# Patient Record
Sex: MALE | Race: WHITE | Employment: OTHER | URBAN - METROPOLITAN AREA
[De-identification: names, ages, dates, MRNs, and addresses within clinical notes are randomized per-mention and may not be internally consistent; named-entity substitution may affect disease eponyms.]

---

## 2021-01-04 ENCOUNTER — OFFICE VISIT (OUTPATIENT)
Dept: PRIMARY CARE CLINIC | Age: 71
End: 2021-01-04
Payer: MEDICARE

## 2021-01-04 DIAGNOSIS — Z01.812 PRE-PROCEDURAL LABORATORY EXAMINATIONS: Primary | ICD-10-CM

## 2021-01-04 PROCEDURE — 99211 OFF/OP EST MAY X REQ PHY/QHP: CPT | Performed by: NURSE PRACTITIONER

## 2021-01-05 LAB — SARS-COV-2, NAA: NOT DETECTED

## 2021-01-07 ENCOUNTER — HOSPITAL ENCOUNTER (OUTPATIENT)
Dept: GENERAL RADIOLOGY | Age: 71
Discharge: HOME | End: 2021-01-07
Payer: MEDICARE

## 2021-01-07 ENCOUNTER — HOSPITAL ENCOUNTER (OUTPATIENT)
Dept: CT IMAGING | Age: 71
Discharge: HOME | End: 2021-01-07
Payer: MEDICARE

## 2021-01-07 VITALS
RESPIRATION RATE: 16 BRPM | DIASTOLIC BLOOD PRESSURE: 73 MMHG | BODY MASS INDEX: 26.05 KG/M2 | OXYGEN SATURATION: 95 % | HEIGHT: 70 IN | SYSTOLIC BLOOD PRESSURE: 152 MMHG | TEMPERATURE: 97.5 F | HEART RATE: 68 BPM | WEIGHT: 182 LBS

## 2021-01-07 DIAGNOSIS — M43.16 SPONDYLOLISTHESIS, LUMBAR REGION: ICD-10-CM

## 2021-01-07 DIAGNOSIS — M51.36 LUMBAR ADJACENT SEGMENT DISEASE WITH SPONDYLOLISTHESIS: ICD-10-CM

## 2021-01-07 DIAGNOSIS — M43.16 LUMBAR ADJACENT SEGMENT DISEASE WITH SPONDYLOLISTHESIS: ICD-10-CM

## 2021-01-07 LAB
GLUCOSE BLD-MCNC: 147 MG/DL (ref 70–99)
INR BLD: 0.96 (ref 0.86–1.14)
PERFORMED ON: ABNORMAL
PLATELET # BLD: 142 K/UL (ref 135–450)
PROTHROMBIN TIME: 11.1 SEC (ref 10–13.2)

## 2021-01-07 PROCEDURE — 72132 CT LUMBAR SPINE W/DYE: CPT

## 2021-01-07 PROCEDURE — 7100000011 HC PHASE II RECOVERY - ADDTL 15 MIN

## 2021-01-07 PROCEDURE — 85610 PROTHROMBIN TIME: CPT

## 2021-01-07 PROCEDURE — 36415 COLL VENOUS BLD VENIPUNCTURE: CPT

## 2021-01-07 PROCEDURE — 62304 MYELOGRAPHY LUMBAR INJECTION: CPT

## 2021-01-07 PROCEDURE — 7100000010 HC PHASE II RECOVERY - FIRST 15 MIN

## 2021-01-07 PROCEDURE — 85049 AUTOMATED PLATELET COUNT: CPT

## 2021-01-07 PROCEDURE — 6360000004 HC RX CONTRAST MEDICATION: Performed by: PHYSICIAN ASSISTANT

## 2021-01-07 PROCEDURE — 72265 MYELOGRAPHY L-S SPINE: CPT

## 2021-01-07 RX ORDER — SODIUM CHLORIDE 0.9 % (FLUSH) 0.9 %
10 SYRINGE (ML) INJECTION EVERY 12 HOURS SCHEDULED
Status: DISCONTINUED | OUTPATIENT
Start: 2021-01-07 | End: 2021-01-08 | Stop reason: HOSPADM

## 2021-01-07 RX ORDER — ATORVASTATIN CALCIUM 40 MG/1
40 TABLET, FILM COATED ORAL DAILY
COMMUNITY

## 2021-01-07 RX ORDER — PANTOPRAZOLE SODIUM 20 MG/1
20 TABLET, DELAYED RELEASE ORAL DAILY
COMMUNITY

## 2021-01-07 RX ORDER — LOSARTAN POTASSIUM AND HYDROCHLOROTHIAZIDE 25; 100 MG/1; MG/1
1 TABLET ORAL DAILY
COMMUNITY

## 2021-01-07 RX ORDER — SODIUM CHLORIDE 0.9 % (FLUSH) 0.9 %
10 SYRINGE (ML) INJECTION PRN
Status: DISCONTINUED | OUTPATIENT
Start: 2021-01-07 | End: 2021-01-08 | Stop reason: HOSPADM

## 2021-01-07 RX ORDER — OXYCODONE AND ACETAMINOPHEN 7.5; 325 MG/1; MG/1
1 TABLET ORAL EVERY 4 HOURS PRN
COMMUNITY
End: 2022-05-10 | Stop reason: ALTCHOICE

## 2021-01-07 RX ADMIN — IOPAMIDOL 20 ML: 408 INJECTION, SOLUTION INTRATHECAL at 12:05

## 2021-01-07 ASSESSMENT — PAIN DESCRIPTION - PAIN TYPE: TYPE: CHRONIC PAIN

## 2021-01-07 ASSESSMENT — PAIN - FUNCTIONAL ASSESSMENT: PAIN_FUNCTIONAL_ASSESSMENT: PREVENTS OR INTERFERES SOME ACTIVE ACTIVITIES AND ADLS

## 2021-01-07 NOTE — PROGRESS NOTES
Ambulatory Surgery/Procedure Discharge Note    Vitals:    01/07/21 1324   BP: (!) 152/73   Pulse: 68   Resp: 16   Temp: 97.5 °F (36.4 °C)   SpO2: 95%   BP stable    No intake/output data recorded. Restroom use offered before discharge. Yes    Pain assessment:  present - adequately treated  Pain Level: (just has \"normal pain\")        Patient discharged to home/self care.  Patient discharged via wheel chair by transporter to waiting family/S.O.       1/7/2021 1:37 PM

## 2021-01-07 NOTE — PROGRESS NOTES
Discharge instructions given to patient. Verbalizes understanding. Just received call that IR is going to re-scan patient. Patient aware.

## 2022-05-10 RX ORDER — TAMSULOSIN HYDROCHLORIDE 0.4 MG/1
0.4 CAPSULE ORAL DAILY
COMMUNITY
Start: 2020-12-23

## 2022-05-10 RX ORDER — ASCORBIC ACID 500 MG
1000 TABLET ORAL DAILY
COMMUNITY

## 2022-05-10 RX ORDER — GLIMEPIRIDE 1 MG/1
1 TABLET ORAL
COMMUNITY
Start: 2020-12-23

## 2022-05-10 RX ORDER — SOLIFENACIN SUCCINATE 5 MG/1
5 TABLET, FILM COATED ORAL DAILY
COMMUNITY

## 2022-05-10 RX ORDER — HYDROCODONE BITARTRATE AND ACETAMINOPHEN 7.5; 325 MG/1; MG/1
1 TABLET ORAL EVERY 6 HOURS PRN
COMMUNITY

## 2022-05-10 RX ORDER — PREGABALIN 100 MG/1
100 CAPSULE ORAL 2 TIMES DAILY
COMMUNITY
Start: 2020-12-09

## 2022-05-10 RX ORDER — VITAMIN E 268 MG
400 CAPSULE ORAL DAILY
COMMUNITY

## 2022-05-10 RX ORDER — DICLOFENAC SODIUM 75 MG/1
75 TABLET, DELAYED RELEASE ORAL DAILY
Status: ON HOLD | COMMUNITY
Start: 2020-12-23 | End: 2022-05-17 | Stop reason: HOSPADM

## 2022-05-10 RX ORDER — LANOLIN ALCOHOL/MO/W.PET/CERES
1000 CREAM (GRAM) TOPICAL DAILY
COMMUNITY

## 2022-05-10 NOTE — PROGRESS NOTES
C-diff Questionnaire:     * Admitted with diarrhea? [] YES    [x]  NO     *Prior history of C-Diff. In last 3 months? [] YES    [x]  NO     *Antibiotic use in the past 6-8 weeks? [x]  NO    []  YES      If yes, which: REASON_________________     *Prior hospitalization or nursing home in the last month? []  YES    [x]  NO     SAFETY FIRST. .call before you fall    [address] time__0930__________        Surgery time__1100__________    Do not eat or drink anything after 12:00 midnight prior to your surgery. Follow your MD/Surgeons pre-procedure instructions regarding your medications  This includes water chewing gum, mints and ice chips- the Day of Surgery. You may brush your teeth and gargle the morning of your surgery, but do not swallow the water     Please see your family doctor/pediatrician for a history and physical and/or questions concerning medications. Bring any test results/reports from your physicians office. If you are under the care of a heart doctor or specialist doctor, please be aware that you may be asked to them for clearance    You may be asked to stop blood thinners such as Coumadin, Plavix, Fragmin, Lovenox, etc., or any anti-inflammatories such as:  Aspirin, Ibuprofen, Advil, Naproxen prior to your surgery. We also ask that you stop any OTC medications such as fish oil, vitamin E, glucosamine, garlic, Multivitamins, COQ 10, etc. MAY TAKE TYLENOL    We ask that you do not smoke 24 hours prior to surgery  We ask that you do not  drink any alcoholic beverages 24 hours prior to surgery     You must make arrangements for a responsible adult to take you home after your surgery. For your safety you will not be allowed to leave alone or drive yourself home. Your surgery will be cancelled if you do not have a ride home.      Also for your safety, it is strongly suggested that someone stay with you the first 24 hours after your surgery. A parent or legal guardian must accompany a child scheduled for surgery and plan to stay at the hospital until the child is discharged. Please do not bring other children with you. For your comfort, please wear simple loose fitting clothing to the hospital.  Please do not bring valuables. Do not wear any make-up or nail polish on your fingers or toes. For your safety, please do not wear any jewelry or body piercing's on the day of surgery. All jewelry must be removed. If you have dentures, they will be removed before going to operating room. For your convenience, we will provide you with a container. If you wear contact lenses or glasses, they will be removed, please bring a case for them. If you have a living will and a durable power of  for healthcare, please bring in a copy. As part of our patient safety program to minimize surgical site infections, we ask you to do the following:    · Please notify your surgeon if you develop any illness between         now and the day of your surgery. · This includes a cough, cold, fever, sore throat, nausea,         or vomiting, and diarrhea, etc.  ·  Please notify your surgeon if you experience dizziness, shortness         of breath or blurred vision between now and the time of your surgery. Do not shave your operative site 96 hours prior to surgery. For face and neck surgery, men may use an electric razor 48 hours   prior to surgery. You may shower the night before surgery or the morning of   your surgery with an antibacterial soap. You will need to bring a photo ID and insurance card     If you use a C-pap or Bi-pap machine, please bring your machine with you to the hospital     Our goal is to provide you with excellent care, therefore, visitors will be limited to so that we may focus on providing this care for you. Please contact your surgeon office, if you have any further questions.

## 2022-05-16 ENCOUNTER — ANESTHESIA EVENT (OUTPATIENT)
Dept: ENDOSCOPY | Age: 72
End: 2022-05-16
Payer: MEDICARE

## 2022-05-17 ENCOUNTER — HOSPITAL ENCOUNTER (OUTPATIENT)
Age: 72
Setting detail: OUTPATIENT SURGERY
Discharge: HOME | End: 2022-05-17
Attending: INTERNAL MEDICINE | Admitting: INTERNAL MEDICINE
Payer: MEDICARE

## 2022-05-17 ENCOUNTER — ANESTHESIA (OUTPATIENT)
Dept: ENDOSCOPY | Age: 72
End: 2022-05-17
Payer: MEDICARE

## 2022-05-17 VITALS
RESPIRATION RATE: 18 BRPM | WEIGHT: 178.7 LBS | DIASTOLIC BLOOD PRESSURE: 84 MMHG | BODY MASS INDEX: 25.58 KG/M2 | SYSTOLIC BLOOD PRESSURE: 162 MMHG | HEIGHT: 70 IN | TEMPERATURE: 96.5 F | HEART RATE: 89 BPM | OXYGEN SATURATION: 97 %

## 2022-05-17 DIAGNOSIS — K74.60 HEPATIC CIRRHOSIS, UNSPECIFIED HEPATIC CIRRHOSIS TYPE, UNSPECIFIED WHETHER ASCITES PRESENT (HCC): ICD-10-CM

## 2022-05-17 DIAGNOSIS — Z12.11 COLON CANCER SCREENING: ICD-10-CM

## 2022-05-17 LAB
GLUCOSE BLD-MCNC: 128 MG/DL (ref 70–99)
GLUCOSE BLD-MCNC: 140 MG/DL (ref 70–99)
PERFORMED ON: ABNORMAL
PERFORMED ON: ABNORMAL

## 2022-05-17 PROCEDURE — 7100000010 HC PHASE II RECOVERY - FIRST 15 MIN: Performed by: INTERNAL MEDICINE

## 2022-05-17 PROCEDURE — 3609017100 HC EGD: Performed by: INTERNAL MEDICINE

## 2022-05-17 PROCEDURE — 88305 TISSUE EXAM BY PATHOLOGIST: CPT

## 2022-05-17 PROCEDURE — 7100000011 HC PHASE II RECOVERY - ADDTL 15 MIN: Performed by: INTERNAL MEDICINE

## 2022-05-17 PROCEDURE — 2580000003 HC RX 258: Performed by: NURSE ANESTHETIST, CERTIFIED REGISTERED

## 2022-05-17 PROCEDURE — 3609010600 HC COLONOSCOPY POLYPECTOMY SNARE/COLD BIOPSY: Performed by: INTERNAL MEDICINE

## 2022-05-17 PROCEDURE — 2500000003 HC RX 250 WO HCPCS: Performed by: NURSE ANESTHETIST, CERTIFIED REGISTERED

## 2022-05-17 PROCEDURE — 3700000000 HC ANESTHESIA ATTENDED CARE: Performed by: INTERNAL MEDICINE

## 2022-05-17 PROCEDURE — 3700000001 HC ADD 15 MINUTES (ANESTHESIA): Performed by: INTERNAL MEDICINE

## 2022-05-17 PROCEDURE — 2709999900 HC NON-CHARGEABLE SUPPLY: Performed by: INTERNAL MEDICINE

## 2022-05-17 PROCEDURE — 6360000002 HC RX W HCPCS: Performed by: NURSE ANESTHETIST, CERTIFIED REGISTERED

## 2022-05-17 PROCEDURE — 7100000000 HC PACU RECOVERY - FIRST 15 MIN: Performed by: INTERNAL MEDICINE

## 2022-05-17 PROCEDURE — 7100000001 HC PACU RECOVERY - ADDTL 15 MIN: Performed by: INTERNAL MEDICINE

## 2022-05-17 RX ORDER — LIDOCAINE HYDROCHLORIDE 20 MG/ML
INJECTION, SOLUTION EPIDURAL; INFILTRATION; INTRACAUDAL; PERINEURAL PRN
Status: DISCONTINUED | OUTPATIENT
Start: 2022-05-17 | End: 2022-05-17 | Stop reason: SDUPTHER

## 2022-05-17 RX ORDER — SODIUM CHLORIDE 0.9 % (FLUSH) 0.9 %
5-40 SYRINGE (ML) INJECTION PRN
Status: DISCONTINUED | OUTPATIENT
Start: 2022-05-17 | End: 2022-05-17 | Stop reason: HOSPADM

## 2022-05-17 RX ORDER — SODIUM CHLORIDE 9 MG/ML
INJECTION, SOLUTION INTRAVENOUS CONTINUOUS
Status: DISCONTINUED | OUTPATIENT
Start: 2022-05-17 | End: 2022-05-17 | Stop reason: HOSPADM

## 2022-05-17 RX ORDER — SODIUM CHLORIDE 0.9 % (FLUSH) 0.9 %
5-40 SYRINGE (ML) INJECTION EVERY 12 HOURS SCHEDULED
Status: DISCONTINUED | OUTPATIENT
Start: 2022-05-17 | End: 2022-05-17 | Stop reason: HOSPADM

## 2022-05-17 RX ORDER — ONDANSETRON 2 MG/ML
4 INJECTION INTRAMUSCULAR; INTRAVENOUS
Status: CANCELLED | OUTPATIENT
Start: 2022-05-17 | End: 2022-05-17

## 2022-05-17 RX ORDER — SODIUM CHLORIDE 0.9 % (FLUSH) 0.9 %
5-40 SYRINGE (ML) INJECTION PRN
Status: CANCELLED | OUTPATIENT
Start: 2022-05-17

## 2022-05-17 RX ORDER — GLYCOPYRROLATE 0.2 MG/ML
INJECTION INTRAMUSCULAR; INTRAVENOUS PRN
Status: DISCONTINUED | OUTPATIENT
Start: 2022-05-17 | End: 2022-05-17 | Stop reason: SDUPTHER

## 2022-05-17 RX ORDER — SODIUM CHLORIDE 9 MG/ML
INJECTION, SOLUTION INTRAVENOUS PRN
Status: CANCELLED | OUTPATIENT
Start: 2022-05-17

## 2022-05-17 RX ORDER — SODIUM CHLORIDE 9 MG/ML
INJECTION, SOLUTION INTRAVENOUS CONTINUOUS PRN
Status: DISCONTINUED | OUTPATIENT
Start: 2022-05-17 | End: 2022-05-17 | Stop reason: SDUPTHER

## 2022-05-17 RX ORDER — PROPOFOL 10 MG/ML
INJECTION, EMULSION INTRAVENOUS PRN
Status: DISCONTINUED | OUTPATIENT
Start: 2022-05-17 | End: 2022-05-17 | Stop reason: SDUPTHER

## 2022-05-17 RX ORDER — SODIUM CHLORIDE 0.9 % (FLUSH) 0.9 %
5-40 SYRINGE (ML) INJECTION EVERY 12 HOURS SCHEDULED
Status: CANCELLED | OUTPATIENT
Start: 2022-05-17

## 2022-05-17 RX ORDER — SODIUM CHLORIDE 9 MG/ML
INJECTION, SOLUTION INTRAVENOUS PRN
Status: DISCONTINUED | OUTPATIENT
Start: 2022-05-17 | End: 2022-05-17 | Stop reason: HOSPADM

## 2022-05-17 RX ADMIN — GLYCOPYRROLATE 0.2 MG: 0.2 INJECTION, SOLUTION INTRAMUSCULAR; INTRAVENOUS at 10:56

## 2022-05-17 RX ADMIN — SODIUM CHLORIDE: 9 INJECTION, SOLUTION INTRAVENOUS at 10:53

## 2022-05-17 RX ADMIN — PROPOFOL 100 MG: 10 INJECTION, EMULSION INTRAVENOUS at 10:56

## 2022-05-17 RX ADMIN — PROPOFOL 100 MG: 10 INJECTION, EMULSION INTRAVENOUS at 11:00

## 2022-05-17 RX ADMIN — PROPOFOL 100 MG: 10 INJECTION, EMULSION INTRAVENOUS at 11:05

## 2022-05-17 RX ADMIN — LIDOCAINE HYDROCHLORIDE 60 MG: 20 INJECTION, SOLUTION EPIDURAL; INFILTRATION; INTRACAUDAL; PERINEURAL at 10:56

## 2022-05-17 RX ADMIN — PROPOFOL 100 MG: 10 INJECTION, EMULSION INTRAVENOUS at 11:15

## 2022-05-17 ASSESSMENT — PAIN DESCRIPTION - DESCRIPTORS: DESCRIPTORS: ACHING

## 2022-05-17 ASSESSMENT — LIFESTYLE VARIABLES: SMOKING_STATUS: 0

## 2022-05-17 ASSESSMENT — PAIN - FUNCTIONAL ASSESSMENT
PAIN_FUNCTIONAL_ASSESSMENT: 0-10
PAIN_FUNCTIONAL_ASSESSMENT: PREVENTS OR INTERFERES SOME ACTIVE ACTIVITIES AND ADLS

## 2022-05-17 NOTE — OP NOTE
Endoscopy Note    Patient: Dao Elizalde  : 1950  Acct#:     Procedure: Esophagogastroduodenoscopy                        Colonoscopy with polypectomy (cold snare)    Date:  2022     Surgeon:   Charo Clark MD    Referring Physician:  Mey Saxena    Indications: This is a 71 y.o. male  who presents for EGD and colonoscopy due to variceal and colon cancer screening. Postoperative Diagnosis:    1. Small esophageal varices  2. Mild portal hypertensive gastropathy  3. 5 mm transverse colon polyp, resected with cold snare  4. 2 mm transverse colon polyp, resected with cold snare  5. 3 mm rectal polyp, resected with cold snare  6. 4 mm rectal polyp, resected with cold snare  Anesthesia:  MAC    Consent:  The patient or their legal guardian has signed a consent, and is aware of the potential risks, benefits, alternatives, and potential complications of this procedure. These include, but are not limited to hemorrhage, bleeding, post procedural pain, perforation, phlebitis, aspiration, hypotension, hypoxia, cardiovascular events such as arryhthmia, and possibly death. Additionally, the possibility of missed colonic polyps and interval colon cancer was discussed in the consent. Description of Procedure: The patient was then taken to the endoscopy suite, placed in the left lateral decubitus position and the above IV sedation was administrered. The Olympus video endoscope was placed through the patient's oropharynx without difficulty to the extent of the 2nd portion of the duodenum. Both forward and retroflexed views of the stomach were obtained. Findings:    Esophagus: Small esophageal varices were present in the distal esophagus, which flattened on insufflation. The esophagus otherwise appeared normal without evidence of Negrete's esophagus or reflux esophagitis. Z-line was located 37 cm from the incisors. Stomach:  The stomach was notable for mild portal hypertensive gastropathy, and otherwise appeared normal on forward and retroflexed views. Duodenum: The first and 2nd portions of the duodenum appeared normal with normal villous pattern    The scope was then withdrawn back into the stomach, it was decompressed, and the scope was completely withdrawn. A digital rectal examination was performed and revealed negative without mass, lesions or tenderness. The Olympus video colonoscope was placed in the patient's rectum under digital direction and advanced to the cecum. The cecum was identified by characteristic anatomy and ballottment. The prep was good. The ileocecal valve was identified. The terminal ileum was not inspected. The scope was then withdrawn back through the cecum, ascending, transverse, descending, sigmoid colon, and rectum. Careful circumferential examination of the mucosa in these areas demonstrated:    - 5 mm transverse colon polyp, resected with cold snare  - 2 mm transverse colon polyp, resected with cold snare  - 3 mm rectal polyp, resected with cold snare  - 4 mm rectal polyp, resected with cold snare    The scope was then withdrawn into the rectum and retroflexed. The retroflexed view of the anal verge and rectum demonstrates small internal hemorrhoids. The scope was straightened, the colon was decompressed and the scope was withdrawn from the patient. The patient tolerated the procedure well and was taken to the PACU in good condition. Estimated Blood Loss: Minimal     Impression:    1) See post procedure diagnoses    Recommendations:   - Follow-up pathology results in 7 days, by calling the office at [phone].  - Resume regular medications. - Resume diet as tolerated. - Repeat colonoscopy in 5 years. - Will defer initiation of nadolol to Dr. Eleazar Franco. Repeat EGD in 3 years.     ROME Viveros [address] [facility]

## 2022-05-17 NOTE — H&P
Pre-operative History and Physical    Patient: Dao Elizalde  : 1950  Acct#:     Intended Procedure:  EGD and colonoscopy    HISTORY OF PRESENT ILLNESS:  The patient is a 71 y.o. male  who presents for EGD and colonoscopy due to variceal and colon cancer screening. Past Medical History:        Diagnosis Date    Anxiety     Arthritis     Cancer ([facility] [address]     PROSTATE    Circulation problem     DUE TO DIABETES    Diabetes mellitus ([facility] [address]     TYPE 2    Diabetic neuropathy (HCC)     Fibromyalgia     Hepatitis C     TREATED     Hyperlipidemia     Hypertension     Liver cirrhosis (HCC)     Migraine     Short-term memory loss     DUE TO BRAIN ANEURYSM     Past Surgical History:        Procedure Laterality Date    BACK SURGERY      BRAIN SURGERY      INJURED IN [place] IN HEAD-CAUSED ANEURYSM     Medications Prior to Admission:   No current facility-administered medications on file prior to encounter. Current Outpatient Medications on File Prior to Encounter   Medication Sig Dispense Refill    glimepiride (AMARYL) 1 MG tablet Take 1 mg by mouth every morning (before breakfast)       diclofenac (VOLTAREN) 75 MG EC tablet Take 75 mg by mouth daily       pregabalin (LYRICA) 100 MG capsule Take 100 mg by mouth 2 times daily.  tamsulosin (FLOMAX) 0.4 MG capsule Take 0.4 mg by mouth daily       HYDROcodone-acetaminophen (NORCO) 7.5-325 MG per tablet Take 1 tablet by mouth every 6 hours as needed for Pain.       Multiple Vitamin (MULTIVITAMIN ADULT PO) Take 1 tablet by mouth daily      vitamin B-12 (CYANOCOBALAMIN) 1000 MCG tablet Take 1,000 mcg by mouth daily      vitamin C (ASCORBIC ACID) 500 MG tablet Take 1,000 mg by mouth daily      solifenacin (VESICARE) 5 MG tablet Take 5 mg by mouth daily      vitamin E 400 UNIT capsule Take 400 Units by mouth daily      atorvastatin (LIPITOR) 40 MG tablet Take 40 mg by mouth daily      losartan-hydroCHLOROthiazide (HYZAAR) 100-25 MG per tablet Take 1 tablet by mouth daily      metFORMIN (GLUCOPHAGE) 1000 MG tablet Take 1,000 mg by mouth 2 times daily (with meals)      pantoprazole (PROTONIX) 20 MG tablet Take 20 mg by mouth daily          Allergies:  Ace inhibitors and Duloxetine hcl    Social History:   TOBACCO:   reports that he has never smoked. He has never used smokeless tobacco.  ETOH:   reports previous alcohol use. DRUGS:   reports no history of drug use. PHYSICAL EXAM:      Vital Signs: BP (!) 160/82   Pulse 90   Temp 97.7 °F (36.5 °C) (Temporal)   Resp 18   Ht 5' 10\" (1.778 m)   Wt 178 lb 11.2 oz (81.1 kg)   SpO2 95%   BMI 25.64 kg/m²    Airway: No stridor or wheezing noted. Good air movement  Pulmonary: without wheezes. Clear to auscultation  Cardiac:regular rate and rhythm without loud murmurs  Abdomen:soft, nontender,  Bowel sounds present    Pre-Procedure Assessment / Plan:  1) EGD and colonoscopy    ASA Grade:  ASA 3 - Patient with moderate systemic disease with functional limitations  Mallampati Classification:  Class II    Level of Sedation Plan:MAC    Post Procedure plan: Return to same level of care    I assessed the patient and find that the patient is in satisfactory condition to proceed with the planned procedure and sedation plan. I have explained the risk, benefits, and alternatives to the procedure; the patient understands and agrees to proceed.        Roger Zelaya MD  5/17/2022

## 2022-05-17 NOTE — ANESTHESIA POSTPROCEDURE EVALUATION
Department of Anesthesiology  Postprocedure Note    Patient: Hilda Gimenez  MRN: [id number]  YOB: 1950  Date of evaluation: 5/17/2022  Time:  3:47 PM     Procedure Summary     Date: 05/17/22 Room / Location: [address] [place] [address]    Anesthesia Start: 1053 Anesthesia Stop: 1761    Procedures:       ESOPHAGOGASTRODUODENOSCOPY (N/A )      COLONOSCOPY POLYPECTOMY SNARE/COLD BIOPSY (N/A ) Diagnosis:       Hepatic cirrhosis, unspecified hepatic cirrhosis type, unspecified whether ascites present [facility])      Colon cancer screening      (CIRRHOSIS OF LIVER, COLON CANCER SCREENING)    Surgeons: Lolis Vargas MD Responsible Provider: Swapna Edmond MD    Anesthesia Type: MAC ASA Status: 2          Anesthesia Type: MAC    Lela Phase I: Lela Score: 10    Lela Phase II: Lela Score: 10    Last vitals: Reviewed and per EMR flowsheets. Anesthesia Post Evaluation    Patient location during evaluation: PACU  Patient participation: complete - patient participated  Level of consciousness: awake and alert  Airway patency: patent  Nausea & Vomiting: no nausea and no vomiting  Complications: no  Cardiovascular status: hemodynamically stable and blood pressure returned to baseline  Respiratory status: spontaneous ventilation and nonlabored ventilation  Hydration status: stable  Comments: Mr. Miquel Hogan was seen resting comfortably following procedure. Appropriate for discharge home with .        Swapna Edmond MD

## 2022-05-17 NOTE — ANESTHESIA PRE PROCEDURE
Department of Anesthesiology  Preprocedure Note       Name:  Nely Wilder   Age:  71 y.o.  :  1950                                          MRN:  [id number]         Date:  2022      Surgeon: Elo Enriquez):  Jeffy Julian MD    Procedure: Procedure(s):  ESOPHAGOGASTRODUODENOSCOPY  COLONOSCOPY    Medications prior to admission:   Prior to Admission medications    Medication Sig Start Date End Date Taking? Authorizing Provider   glimepiride (AMARYL) 1 MG tablet Take 1 mg by mouth every morning (before breakfast)  20  Yes Historical Provider, MD   diclofenac (VOLTAREN) 75 MG EC tablet Take 75 mg by mouth daily  20  Yes Historical Provider, MD   pregabalin (LYRICA) 100 MG capsule Take 100 mg by mouth 2 times daily. 20  Yes Historical Provider, MD   tamsulosin (FLOMAX) 0.4 MG capsule Take 0.4 mg by mouth daily  20  Yes Historical Provider, MD   HYDROcodone-acetaminophen (NORCO) 7.5-325 MG per tablet Take 1 tablet by mouth every 6 hours as needed for Pain.    Yes Historical Provider, MD   Multiple Vitamin (MULTIVITAMIN ADULT PO) Take 1 tablet by mouth daily   Yes Historical Provider, MD   vitamin B-12 (CYANOCOBALAMIN) 1000 MCG tablet Take 1,000 mcg by mouth daily   Yes Historical Provider, MD   vitamin C (ASCORBIC ACID) 500 MG tablet Take 1,000 mg by mouth daily   Yes Historical Provider, MD   solifenacin (VESICARE) 5 MG tablet Take 5 mg by mouth daily   Yes Historical Provider, MD   vitamin E 400 UNIT capsule Take 400 Units by mouth daily   Yes Historical Provider, MD   atorvastatin (LIPITOR) 40 MG tablet Take 40 mg by mouth daily    Historical Provider, MD   losartan-hydroCHLOROthiazide (HYZAAR) 100-25 MG per tablet Take 1 tablet by mouth daily    Historical Provider, MD   metFORMIN (GLUCOPHAGE) 1000 MG tablet Take 1,000 mg by mouth 2 times daily (with meals)    Historical Provider, MD   pantoprazole (PROTONIX) 20 MG tablet Take 20 mg by mouth daily    Historical Provider, MD Current medications:    Current Facility-Administered Medications   Medication Dose Route Frequency Provider Last Rate Last Admin    0.9 % sodium chloride infusion   IntraVENous Continuous Dread Davis MD        sodium chloride flush 0.9 % injection 5-40 mL  5-40 mL IntraVENous 2 times per day Dread Davis MD        sodium chloride flush 0.9 % injection 5-40 mL  5-40 mL IntraVENous PRN Dread Davis MD        0.9 % sodium chloride infusion   IntraVENous PRN Dread Davis MD           Allergies: Allergies   Allergen Reactions    Ace Inhibitors      Other reaction(s): Cough    Duloxetine Hcl      Other reaction(s): Confusion       Problem List:  There is no problem list on file for this patient.       Past Medical History:        Diagnosis Date    Anxiety     Arthritis     Cancer ([facility] [address] 2015    PROSTATE    Circulation problem     DUE TO DIABETES    Diabetes mellitus ([facility] [address]     TYPE 2    Diabetic neuropathy (HCC)     Fibromyalgia     Hepatitis C     TREATED 2018    Hyperlipidemia     Hypertension     Liver cirrhosis (HCC)     Migraine     Short-term memory loss     DUE TO BRAIN ANEURYSM       Past Surgical History:        Procedure Laterality Date    BACK SURGERY      BRAIN SURGERY      INJURED IN [place] IN HEAD-CAUSED ANEURYSM       Social History:    Social History     Tobacco Use    Smoking status: Never Smoker    Smokeless tobacco: Never Used   Substance Use Topics    Alcohol use: Not Currently     Comment: RARE-FORMER HEAVY DRINKER-QUIT SEVERAL YEARS AGO                                Counseling given: Not Answered      Vital Signs (Current):   Vitals:    05/10/22 0916 05/17/22 0940   BP:  (!) 160/82   Pulse:  90   Resp:  18   Temp:  97.7 °F (36.5 °C)   TempSrc:  Temporal   SpO2:  95%   Weight: 182 lb (82.6 kg) 178 lb 11.2 oz (81.1 kg)   Height: 5' 10\" (1.778 m) 5' 10\" (1.778 m)                                              BP Readings from Last 3 Encounters:   05/17/22 (!) 160/82   01/07/21 (!) 152/73       NPO Status: Time of last liquid consumption: 0500                        Time of last solid consumption: 1700                        Date of last liquid consumption: 05/17/22                        Date of last solid food consumption: 05/15/22    BMI:   Wt Readings from Last 3 Encounters:   05/17/22 178 lb 11.2 oz (81.1 kg)   01/07/21 182 lb (82.6 kg)     Body mass index is 25.64 kg/m².     CBC:   Lab Results   Component Value Date    WBC 4.3 10/25/2021    RBC 4.64 10/25/2021    HGB 13.3 10/25/2021    HCT 41.1 10/25/2021    MCV 88.7 10/25/2021    RDW 15.7 10/25/2021     10/25/2021       CMP:   Lab Results   Component Value Date     10/25/2021    K 4.9 10/25/2021     10/25/2021    CO2 24 10/25/2021    BUN 19 10/25/2021    CREATININE 1.2 10/25/2021    GFRAA >60 10/25/2021    AGRATIO 1.6 10/25/2021    LABGLOM 60 10/25/2021    GLUCOSE 128 10/25/2021    PROT 7.2 10/25/2021    CALCIUM 9.3 10/25/2021    BILITOT 0.6 10/25/2021    ALKPHOS 63 10/25/2021    AST 27 10/25/2021    ALT 24 10/25/2021       POC Tests:   Recent Labs     05/17/22  1000   POCGLU 140*       Coags:   Lab Results   Component Value Date    PROTIME 10.7 10/25/2021    INR 0.95 10/25/2021       HCG (If Applicable): No results found for: PREGTESTUR, PREGSERUM, HCG, HCGQUANT     ABGs: No results found for: PHART, PO2ART, QAX3NPI, SAV6LLO, BEART, Y3IBEIFC     Type & Screen (If Applicable):  No results found for: LABABO, LABRH    Drug/Infectious Status (If Applicable):  No results found for: HIV, HEPCAB    COVID-19 Screening (If Applicable):   Lab Results   Component Value Date    COVID19 NOT DETECTED 01/04/2021           Anesthesia Evaluation   no history of anesthetic complications:   Airway: Mallampati: II       Comment: Prior airway:  Present prior to arrival:No; Mask ventilation:Pre-oxygenation 100% FiO2, Did not attempt mask ventilation; CO2 waveform:Yes; Breath sounds verified:Yes, Equal, Bilateral; ETT Type:Standard; ETT Size:8 mm; Cuffed:Cuffed; Direct Laryngoscopy attempts:1; Laryngoscope type:Limon; Laryngoscope size:2; Grade view:I; Rapid sequence:No; Cricoid pressure applied:No; Procedure status:No trauma, Dentition as pre-op  Mouth opening: > = 3 FB Dental:    (+) caps  Comment: Denies loose teeth    Pulmonary:       (-) COPD, asthma, rhonchi, wheezes, rales and not a current smoker                           Cardiovascular:    (+) hypertension:, hyperlipidemia    (-) orthopnea,  GAYLE and murmurPeripheral edema: trace. Rhythm: regular  Rate: normal                    Neuro/Psych:   (+) headaches: migraine headaches,    (-) seizures            ROS comment: Chronic pain: fibromyalgia  Chronic back pain s/p anterior-posterior lumbar fusion    Intracranial shrapnel ([address] [place] [address] [place]) GI/Hepatic/Renal:   (+) GERD:, hepatitis: C, liver disease (Cirrhosis 2/2 HepC):, bowel prep,      (-) no morbid obesityRenal disease: SCr: 1.2. Endo/Other:    (+) DiabetesType II DM, , malignancy/cancer (prostate). (-) hypothyroidism, hyperthyroidism, blood dyscrasia                ROS comment: History of heavy EtOH use, s/p cessation x several years. Abdominal:         (-) obese Abdomen: soft. Vascular: Other Findings:           Anesthesia Plan      MAC     ASA 2     (NPO appropriate; [place] denies active nausea / reflux)        Anesthetic plan and risks discussed with patient (wife present at bedside). Plan discussed with CRNA. This pre-anesthesia assessment may be used as a history and physical.    DOS STAFF ADDENDUM:    Pt seen and examined, chart reviewed (including anesthesia, drug and allergy history). No interval changes to history and physical examination. Anesthetic plan, risks, benefits, alternatives, and personnel involved discussed with patient. Patient verbalized an understanding and agrees to proceed.       Evelin Her MD  May 17, 2022  10:09 AM

## 2022-05-17 NOTE — PROGRESS NOTES
Ambulatory Surgery/Procedure Discharge Note    Vitals:    05/17/22 1220   BP: (!) 162/84   Pulse: 89   Resp: 18   Temp: 96.5 °F (35.8 °C)   SpO2: 97%   BP same as pre-procedure    In: 800 [I.V.:800]  Out: -     Restroom use offered before discharge. Yes    Pain assessment:  none  Pain Level: 4    Discharge instructions given to patient and his wife. Verbalizes understanding. Patient discharged to home/self care.  Patient discharged via wheel chair by transporter to waiting family/S.O.       5/17/2022 12:27 PM

## 2023-05-30 ENCOUNTER — APPOINTMENT (OUTPATIENT)
Dept: CT IMAGING | Age: 73
End: 2023-05-30
Payer: MEDICARE

## 2023-05-30 ENCOUNTER — HOSPITAL ENCOUNTER (EMERGENCY)
Age: 73
Discharge: HOME | End: 2023-05-30
Payer: MEDICARE

## 2023-05-30 VITALS
BODY MASS INDEX: 25 KG/M2 | SYSTOLIC BLOOD PRESSURE: 144 MMHG | RESPIRATION RATE: 16 BRPM | HEIGHT: 70 IN | DIASTOLIC BLOOD PRESSURE: 62 MMHG | WEIGHT: 174.6 LBS | OXYGEN SATURATION: 94 % | TEMPERATURE: 97.8 F | HEART RATE: 84 BPM

## 2023-05-30 DIAGNOSIS — S01.81XA FACIAL LACERATION, INITIAL ENCOUNTER: Primary | ICD-10-CM

## 2023-05-30 DIAGNOSIS — S09.90XA INJURY OF HEAD, INITIAL ENCOUNTER: ICD-10-CM

## 2023-05-30 PROCEDURE — 70450 CT HEAD/BRAIN W/O DYE: CPT

## 2023-05-30 PROCEDURE — 12011 RPR F/E/E/N/L/M 2.5 CM/<: CPT

## 2023-05-30 PROCEDURE — 2500000003 HC RX 250 WO HCPCS: Performed by: PHYSICIAN ASSISTANT

## 2023-05-30 PROCEDURE — 99284 EMERGENCY DEPT VISIT MOD MDM: CPT

## 2023-05-30 PROCEDURE — 12052 INTMD RPR FACE/MM 2.6-5.0 CM: CPT

## 2023-05-30 RX ORDER — LIDOCAINE HYDROCHLORIDE AND EPINEPHRINE 10; 10 MG/ML; UG/ML
20 INJECTION, SOLUTION INFILTRATION; PERINEURAL ONCE
Status: COMPLETED | OUTPATIENT
Start: 2023-05-30 | End: 2023-05-30

## 2023-05-30 RX ADMIN — LIDOCAINE HYDROCHLORIDE,EPINEPHRINE BITARTRATE 20 ML: 10; .01 INJECTION, SOLUTION INFILTRATION; PERINEURAL at 20:35

## 2023-05-30 ASSESSMENT — PAIN DESCRIPTION - PAIN TYPE: TYPE: ACUTE PAIN

## 2023-05-30 ASSESSMENT — LIFESTYLE VARIABLES
HOW OFTEN DO YOU HAVE A DRINK CONTAINING ALCOHOL: MONTHLY OR LESS
HOW MANY STANDARD DRINKS CONTAINING ALCOHOL DO YOU HAVE ON A TYPICAL DAY: 1 OR 2

## 2023-05-30 ASSESSMENT — PAIN - FUNCTIONAL ASSESSMENT: PAIN_FUNCTIONAL_ASSESSMENT: 0-10

## 2023-05-30 ASSESSMENT — PAIN SCALES - GENERAL
PAINLEVEL_OUTOF10: 0
PAINLEVEL_OUTOF10: 4

## 2023-05-30 ASSESSMENT — PAIN DESCRIPTION - LOCATION: LOCATION: HEAD

## 2023-05-30 NOTE — ED TRIAGE NOTES
Pt into ER from home with c/c Trip and fall, striking head on marble counter. Inactive bleeding at this time. Pt denies LOC. Pt denies taking any anticoagulants.

## 2023-05-30 NOTE — ED PROVIDER NOTES
[facility]        Pt Name: Emerson Rudd  MRN: [id number]  [place] 1950  Date of evaluation: 5/30/2023  Provider: Jem Bowling PA-C  PCP: Izabela Jaimes  Note Started: 7:12 PM EDT 5/30/23      JUSTINE. I have evaluated this patient. CHIEF COMPLAINT       Chief Complaint   Patient presents with    Laceration     Trip and fall, striking head on marble counter. Inactive bleeding at this time. Pt denies LOC. HISTORY OF PRESENT ILLNESS: 1 or more Elements             Emerson Rudd is a 68 y.o. male who presents emergency department after he tripped on his \"house shoes \"while carrying a plate and hit his head on the plate and on the counter. He denies loss of consciousness. He just now has a laceration to his forehead, bridge of his nose. He does state that he has had an injury to his head before where he has had to have brain surgery where he had shrapnel in his head while he was in the service. Nursing Notes were all reviewed and agreed with or any disagreements were addressed in the HPI. REVIEW OF SYSTEMS :      Review of Systems   All other systems reviewed and are negative. Positives and Pertinent negatives as per HPI.      SURGICAL HISTORY     Past Surgical History:   Procedure Laterality Date    BACK SURGERY      BRAIN SURGERY      INJURED IN [place] IN HEAD-CAUSED ANEURYSM    COLONOSCOPY N/A 5/17/2022    COLONOSCOPY POLYPECTOMY SNARE/COLD BIOPSY performed by Juan Jose Magaña MD at [address] [place] [address] 5/17/2022    ESOPHAGOGASTRODUODENOSCOPY performed by Juan Jose Magaña MD at [facility]. [address]       Discharge Medication List as of 5/30/2023  8:31 PM        CONTINUE these medications which have NOT CHANGED    Details   glimepiride (AMARYL) 1 MG tablet Take 1 mg by mouth every morning (before breakfast) Historical Med

## 2025-05-14 ENCOUNTER — TRANSCRIBE ORDERS (OUTPATIENT)
Dept: GENERAL RADIOLOGY | Age: 75
End: 2025-05-14

## 2025-05-14 ENCOUNTER — HOSPITAL ENCOUNTER (OUTPATIENT)
Age: 75
Discharge: HOME | End: 2025-05-14
Payer: MEDICARE

## 2025-05-14 ENCOUNTER — HOSPITAL ENCOUNTER (OUTPATIENT)
Dept: GENERAL RADIOLOGY | Age: 75
Discharge: HOME | End: 2025-05-14
Payer: MEDICARE

## 2025-05-14 PROCEDURE — 72120 X-RAY BEND ONLY L-S SPINE: CPT

## 2025-08-28 ENCOUNTER — TRANSCRIBE ORDERS (OUTPATIENT)
Dept: ADMINISTRATIVE | Age: 75
End: 2025-08-28

## 2025-08-28 DIAGNOSIS — M54.16 LUMBAR RADICULOPATHY: Primary | ICD-10-CM

## (undated) DEVICE — TRAP POLYP ETRAP

## (undated) DEVICE — ENDOSCOPY KIT: Brand: MEDLINE INDUSTRIES, INC.

## (undated) DEVICE — BITE BLOCK ENDOSCP AD 60 FR W/ ADJ STRP PLAS GRN BLOX

## (undated) DEVICE — CONTAINER SPEC 480ML CLR POLYSTYR 10% NEUT BUFF FRMLN ZN